# Patient Record
Sex: MALE | Race: WHITE | URBAN - METROPOLITAN AREA
[De-identification: names, ages, dates, MRNs, and addresses within clinical notes are randomized per-mention and may not be internally consistent; named-entity substitution may affect disease eponyms.]

---

## 2017-04-13 ENCOUNTER — HISTORICAL (OUTPATIENT)
Dept: RADIOLOGY | Facility: HOSPITAL | Age: 57
End: 2017-04-13

## 2020-12-11 ENCOUNTER — HISTORICAL (OUTPATIENT)
Dept: RADIOLOGY | Facility: HOSPITAL | Age: 60
End: 2020-12-11

## 2022-04-11 ENCOUNTER — HISTORICAL (OUTPATIENT)
Dept: ADMINISTRATIVE | Facility: HOSPITAL | Age: 62
End: 2022-04-11

## 2022-04-27 VITALS
BODY MASS INDEX: 24.66 KG/M2 | SYSTOLIC BLOOD PRESSURE: 118 MMHG | DIASTOLIC BLOOD PRESSURE: 84 MMHG | WEIGHT: 148 LBS | OXYGEN SATURATION: 98 % | HEIGHT: 65 IN

## 2025-02-14 ENCOUNTER — LAB VISIT (OUTPATIENT)
Dept: LAB | Facility: HOSPITAL | Age: 65
End: 2025-02-14
Attending: NURSE PRACTITIONER
Payer: MEDICARE

## 2025-02-14 DIAGNOSIS — Z82.49 FAMILY HISTORY OF ISCHEMIC HEART DISEASE: Primary | ICD-10-CM

## 2025-02-14 DIAGNOSIS — E78.5 HYPERLIPIDEMIA, UNSPECIFIED HYPERLIPIDEMIA TYPE: ICD-10-CM

## 2025-02-14 DIAGNOSIS — Z12.5 SPECIAL SCREENING FOR MALIGNANT NEOPLASM OF PROSTATE: ICD-10-CM

## 2025-02-14 DIAGNOSIS — I10 ESSENTIAL HYPERTENSION, MALIGNANT: ICD-10-CM

## 2025-02-14 LAB
ALBUMIN SERPL-MCNC: 3.4 G/DL (ref 3.4–4.8)
ALBUMIN/GLOB SERPL: 0.7 RATIO (ref 1.1–2)
ALP SERPL-CCNC: 85 UNIT/L (ref 40–150)
ALT SERPL-CCNC: 8 UNIT/L (ref 0–55)
ANION GAP SERPL CALC-SCNC: 5 MEQ/L
AST SERPL-CCNC: 21 UNIT/L (ref 5–34)
BACTERIA #/AREA URNS AUTO: ABNORMAL /HPF
BILIRUB SERPL-MCNC: 0.4 MG/DL
BILIRUB UR QL STRIP.AUTO: NEGATIVE
BUN SERPL-MCNC: 14.6 MG/DL (ref 8.4–25.7)
CALCIUM SERPL-MCNC: 9.8 MG/DL (ref 8.8–10)
CHLORIDE SERPL-SCNC: 106 MMOL/L (ref 98–107)
CHOLEST SERPL-MCNC: 233 MG/DL
CHOLEST/HDLC SERPL: 6 {RATIO} (ref 0–5)
CLARITY UR: CLEAR
CO2 SERPL-SCNC: 31 MMOL/L (ref 23–31)
COLOR UR AUTO: ABNORMAL
CREAT SERPL-MCNC: 0.97 MG/DL (ref 0.72–1.25)
CREAT/UREA NIT SERPL: 15
ERYTHROCYTE [DISTWIDTH] IN BLOOD BY AUTOMATED COUNT: 14.8 % (ref 11.5–17)
GFR SERPLBLD CREATININE-BSD FMLA CKD-EPI: >60 ML/MIN/1.73/M2
GLOBULIN SER-MCNC: 4.7 GM/DL (ref 2.4–3.5)
GLUCOSE SERPL-MCNC: 97 MG/DL (ref 82–115)
GLUCOSE UR QL STRIP: NEGATIVE
HCT VFR BLD AUTO: 45.8 % (ref 42–52)
HDLC SERPL-MCNC: 42 MG/DL (ref 35–60)
HGB BLD-MCNC: 15 G/DL (ref 14–18)
HGB UR QL STRIP: NEGATIVE
KETONES UR QL STRIP: NEGATIVE
LDLC SERPL CALC-MCNC: 151 MG/DL (ref 50–140)
LEUKOCYTE ESTERASE UR QL STRIP: NEGATIVE
MCH RBC QN AUTO: 29.5 PG (ref 27–31)
MCHC RBC AUTO-ENTMCNC: 32.8 G/DL (ref 33–36)
MCV RBC AUTO: 90.2 FL (ref 80–94)
MUCOUS THREADS URNS QL MICRO: ABNORMAL /LPF
NITRITE UR QL STRIP: POSITIVE
PH UR STRIP: 6 [PH]
PLATELET # BLD AUTO: 389 X10(3)/MCL (ref 130–400)
PMV BLD AUTO: 9.4 FL (ref 7.4–10.4)
POTASSIUM SERPL-SCNC: 4.9 MMOL/L (ref 3.5–5.1)
PROT SERPL-MCNC: 8.1 GM/DL (ref 5.8–7.6)
PROT UR QL STRIP: NEGATIVE
PSA SERPL-MCNC: 5.39 NG/ML
RBC # BLD AUTO: 5.08 X10(6)/MCL (ref 4.7–6.1)
RBC #/AREA URNS AUTO: ABNORMAL /HPF
SODIUM SERPL-SCNC: 142 MMOL/L (ref 136–145)
SP GR UR STRIP.AUTO: 1.02 (ref 1–1.03)
SQUAMOUS #/AREA URNS AUTO: ABNORMAL /HPF
TRIGL SERPL-MCNC: 202 MG/DL (ref 34–140)
TSH SERPL-ACNC: 3.23 UIU/ML (ref 0.35–4.94)
UROBILINOGEN UR STRIP-ACNC: 0.2
VLDLC SERPL CALC-MCNC: 40 MG/DL
WBC # BLD AUTO: 9.43 X10(3)/MCL (ref 4.5–11.5)
WBC #/AREA URNS AUTO: ABNORMAL /HPF

## 2025-02-14 PROCEDURE — 36415 COLL VENOUS BLD VENIPUNCTURE: CPT

## 2025-02-14 PROCEDURE — 80061 LIPID PANEL: CPT

## 2025-02-14 PROCEDURE — 80053 COMPREHEN METABOLIC PANEL: CPT

## 2025-02-14 PROCEDURE — 85027 COMPLETE CBC AUTOMATED: CPT

## 2025-02-14 PROCEDURE — 84153 ASSAY OF PSA TOTAL: CPT

## 2025-02-14 PROCEDURE — 81003 URINALYSIS AUTO W/O SCOPE: CPT

## 2025-02-14 PROCEDURE — 84443 ASSAY THYROID STIM HORMONE: CPT

## 2025-02-16 ENCOUNTER — HOSPITAL ENCOUNTER (EMERGENCY)
Facility: HOSPITAL | Age: 65
Discharge: HOME | End: 2025-02-17
Attending: SPECIALIST
Payer: MEDICARE

## 2025-02-16 VITALS
BODY MASS INDEX: 23.56 KG/M2 | OXYGEN SATURATION: 94 % | HEIGHT: 60 IN | HEART RATE: 94 BPM | WEIGHT: 120 LBS | SYSTOLIC BLOOD PRESSURE: 151 MMHG | DIASTOLIC BLOOD PRESSURE: 85 MMHG | TEMPERATURE: 98 F | RESPIRATION RATE: 18 BRPM

## 2025-02-16 DIAGNOSIS — T88.7XXA MEDICATION SIDE EFFECT: Primary | ICD-10-CM

## 2025-02-16 PROCEDURE — 99283 EMERGENCY DEPT VISIT LOW MDM: CPT

## 2025-02-17 NOTE — ED PROVIDER NOTES
"Encounter Date: 2/16/2025       History     Chief Complaint   Patient presents with    Medication Reaction     CO of new medication that he started yesterday "knocking" him out and causing the shakes. Does not know what mediciation but per JUNIOR just started Seroquel.      65-year-old male believes he had a reaction to Seroquel which made him feel "knocked out"; currently has no other complaints    The history is provided by the patient.     Review of patient's allergies indicates:  No Known Allergies  No past medical history on file.  No past surgical history on file.  No family history on file.  Social History[1]  Review of Systems   Constitutional: Negative.    HENT: Negative.     Respiratory: Negative.     Cardiovascular: Negative.    Gastrointestinal: Negative.    Genitourinary: Negative.    Musculoskeletal: Negative.    Neurological: Negative.    Psychiatric/Behavioral:  The patient is nervous/anxious.        Physical Exam     Initial Vitals [02/16/25 1956]   BP Pulse Resp Temp SpO2   (!) 151/85 94 18 97.9 °F (36.6 °C) (!) 94 %      MAP       --         Physical Exam    Nursing note and vitals reviewed.  Constitutional: He appears well-developed and well-nourished.   HENT:   Head: Normocephalic and atraumatic.   Eyes: EOM are normal. Pupils are equal, round, and reactive to light.   Neck: Neck supple.   Normal range of motion.  Cardiovascular:  Normal rate, regular rhythm and normal heart sounds.           Pulmonary/Chest: Breath sounds normal.   Abdominal: Abdomen is soft. There is no abdominal tenderness.   Musculoskeletal:         General: Normal range of motion.      Cervical back: Normal range of motion and neck supple.     Neurological: He is alert and oriented to person, place, and time.   Skin: Skin is warm and dry.   Psychiatric: He has a normal mood and affect. His behavior is normal. Thought content normal.         ED Course   Procedures  Labs Reviewed - No data to display       Imaging Results  "   None          Medications - No data to display  Medical Decision Making  Medication side-effect    Risk  Risk Details: Instructed to take half dose of Seroquel and follow up with primary care physician; exam unremarkable                                      Clinical Impression:  Final diagnoses:  [T88.7XXA] Medication side effect (Primary)          ED Disposition Condition    Discharge Stable          ED Prescriptions    None       Follow-up Information       Follow up With Specialties Details Why Contact Info    Primary care MD  Schedule an appointment as soon as possible for a visit                  [1]         Carroll Weir MD  02/16/25 [id number]

## 2025-02-17 NOTE — ED NOTES
"Attempted to call "Josef" again without any contact. Charge RN called pt's listed emergency contact- informed of d/c. Emergency contact unable to give pt a ride. Informed Emergency contact to contact Josef.   "

## 2025-02-17 NOTE — ED NOTES
"Attempted to call pt's transportation contact: "Josef" at [phone], number in pt's belongings- no answer. Unable to make contact.   "

## 2025-07-02 ENCOUNTER — HOSPITAL ENCOUNTER (OUTPATIENT)
Facility: HOSPITAL | Age: 65
Discharge: HOME | End: 2025-07-03
Admitting: INTERNAL MEDICINE
Payer: MEDICARE

## 2025-07-02 DIAGNOSIS — I63.9 CVA (CEREBRAL VASCULAR ACCIDENT): Primary | ICD-10-CM

## 2025-07-02 DIAGNOSIS — E78.5 HYPERLIPIDEMIA, UNSPECIFIED HYPERLIPIDEMIA TYPE: ICD-10-CM

## 2025-07-02 DIAGNOSIS — R29.818 ACUTE FOCAL NEUROLOGICAL DEFICIT: ICD-10-CM

## 2025-07-02 LAB
ACCEPTIBLE SP GR UR QL: >=1.03 (ref 1–1.03)
ALBUMIN SERPL-MCNC: 3.3 G/DL (ref 3.4–4.8)
ALBUMIN/GLOB SERPL: 0.9 RATIO (ref 1.1–2)
ALP SERPL-CCNC: 77 UNIT/L (ref 40–150)
ALT SERPL-CCNC: 10 UNIT/L (ref 0–55)
AMPHET UR QL SCN: NEGATIVE
ANION GAP SERPL CALC-SCNC: 2 MEQ/L
AST SERPL-CCNC: 22 UNIT/L (ref 11–45)
BARBITURATE SCN PRESENT UR: NEGATIVE
BASOPHILS # BLD AUTO: 0.01 X10(3)/MCL
BASOPHILS NFR BLD AUTO: 0.2 %
BENZODIAZ UR QL SCN: NEGATIVE
BILIRUB SERPL-MCNC: 0.4 MG/DL
BUN SERPL-MCNC: 15.5 MG/DL (ref 8.4–25.7)
CALCIUM SERPL-MCNC: 9.3 MG/DL (ref 8.8–10)
CANNABINOIDS UR QL SCN: NEGATIVE
CHLORIDE SERPL-SCNC: 111 MMOL/L (ref 98–107)
CHOLEST SERPL-MCNC: 164 MG/DL
CHOLEST/HDLC SERPL: 5 {RATIO} (ref 0–5)
CO2 SERPL-SCNC: 33 MMOL/L (ref 23–31)
COCAINE UR QL SCN: NEGATIVE
CREAT SERPL-MCNC: 0.97 MG/DL (ref 0.72–1.25)
CREAT/UREA NIT SERPL: 16
EOSINOPHIL # BLD AUTO: 0.29 X10(3)/MCL (ref 0–0.9)
EOSINOPHIL NFR BLD AUTO: 5.9 %
ERYTHROCYTE [DISTWIDTH] IN BLOOD BY AUTOMATED COUNT: 16.5 % (ref 11.5–17)
ETHANOL SERPL-MCNC: <10 MG/DL
FENTANYL UR QL SCN: NEGATIVE
GFR SERPLBLD CREATININE-BSD FMLA CKD-EPI: >60 ML/MIN/1.73/M2
GLOBULIN SER-MCNC: 3.7 GM/DL (ref 2.4–3.5)
GLUCOSE SERPL-MCNC: 114 MG/DL (ref 82–115)
HCO3 UR-SCNC: 26.9 MMOL/L (ref 24–28)
HCT VFR BLD AUTO: 60.1 % (ref 42–52)
HDLC SERPL-MCNC: 35 MG/DL (ref 35–60)
HGB BLD-MCNC: 19.7 G/DL (ref 14–18)
IMM GRANULOCYTES # BLD AUTO: 0 X10(3)/MCL (ref 0–0.04)
IMM GRANULOCYTES NFR BLD AUTO: 0 %
INR PPP: 1.1
LDLC SERPL CALC-MCNC: 109 MG/DL (ref 50–140)
LYMPHOCYTES # BLD AUTO: 1.79 X10(3)/MCL (ref 0.6–4.6)
LYMPHOCYTES NFR BLD AUTO: 36.3 %
MCH RBC QN AUTO: 28.5 PG (ref 27–31)
MCHC RBC AUTO-ENTMCNC: 32.8 G/DL (ref 33–36)
MCV RBC AUTO: 86.8 FL (ref 80–94)
MONOCYTES # BLD AUTO: 0.25 X10(3)/MCL (ref 0.1–1.3)
MONOCYTES NFR BLD AUTO: 5.1 %
NEUTROPHILS # BLD AUTO: 2.59 X10(3)/MCL (ref 2.1–9.2)
NEUTROPHILS NFR BLD AUTO: 52.5 %
OPIATES UR QL SCN: NEGATIVE
PCO2 BLDA: 46.4 MMHG (ref 35–45)
PCP UR QL: NEGATIVE
PH SMN: 7.37 [PH] (ref 7.35–7.45)
PH UR: 5.5 [PH] (ref 3–11)
PLATELET # BLD AUTO: 104 X10(3)/MCL (ref 130–400)
PMV BLD AUTO: 9.6 FL (ref 7.4–10.4)
PO2 BLDA: 57 MMHG (ref 40–60)
POC BE: 2 MMOL/L (ref -2–2)
POC SATURATED O2: 88 % (ref 95–100)
POC TCO2: 28 MMOL/L (ref 24–29)
POTASSIUM SERPL-SCNC: 3.3 MMOL/L (ref 3.5–5.1)
PROT SERPL-MCNC: 7 GM/DL (ref 5.8–7.6)
PROTHROMBIN TIME: 11 SECONDS (ref 12.5–14.5)
RBC # BLD AUTO: 6.92 X10(6)/MCL (ref 4.7–6.1)
SAMPLE: ABNORMAL
SODIUM SERPL-SCNC: 146 MMOL/L (ref 136–145)
TRIGL SERPL-MCNC: 102 MG/DL (ref 34–140)
TSH SERPL-ACNC: 2.66 UIU/ML (ref 0.35–4.94)
VLDLC SERPL CALC-MCNC: 20 MG/DL
WBC # BLD AUTO: 4.93 X10(3)/MCL (ref 4.5–11.5)

## 2025-07-02 PROCEDURE — 99900035 HC TECH TIME PER 15 MIN (STAT)

## 2025-07-02 PROCEDURE — 85025 COMPLETE CBC W/AUTO DIFF WBC: CPT

## 2025-07-02 PROCEDURE — 82803 BLOOD GASES ANY COMBINATION: CPT

## 2025-07-02 PROCEDURE — 82077 ASSAY SPEC XCP UR&BREATH IA: CPT

## 2025-07-02 PROCEDURE — 25000003 PHARM REV CODE 250

## 2025-07-02 PROCEDURE — 85610 PROTHROMBIN TIME: CPT

## 2025-07-02 PROCEDURE — 93005 ELECTROCARDIOGRAM TRACING: CPT

## 2025-07-02 PROCEDURE — G0378 HOSPITAL OBSERVATION PER HR: HCPCS

## 2025-07-02 PROCEDURE — 96374 THER/PROPH/DIAG INJ IV PUSH: CPT

## 2025-07-02 PROCEDURE — 80307 DRUG TEST PRSMV CHEM ANLYZR: CPT

## 2025-07-02 PROCEDURE — 84443 ASSAY THYROID STIM HORMONE: CPT

## 2025-07-02 PROCEDURE — 99285 EMERGENCY DEPT VISIT HI MDM: CPT | Mod: 25

## 2025-07-02 PROCEDURE — 80061 LIPID PANEL: CPT

## 2025-07-02 PROCEDURE — 93010 ELECTROCARDIOGRAM REPORT: CPT | Mod: ,,, | Performed by: INTERNAL MEDICINE

## 2025-07-02 PROCEDURE — 80053 COMPREHEN METABOLIC PANEL: CPT

## 2025-07-02 PROCEDURE — 63600175 PHARM REV CODE 636 W HCPCS

## 2025-07-02 RX ORDER — ENOXAPARIN SODIUM 100 MG/ML
40 INJECTION SUBCUTANEOUS EVERY 24 HOURS
Status: DISCONTINUED | OUTPATIENT
Start: 2025-07-03 | End: 2025-07-03 | Stop reason: HOSPADM

## 2025-07-02 RX ORDER — SERTRALINE HYDROCHLORIDE 100 MG/1
200 TABLET, FILM COATED ORAL DAILY
Status: ON HOLD | COMMUNITY
Start: 2025-05-14 | End: 2025-07-03 | Stop reason: HOSPADM

## 2025-07-02 RX ORDER — CITALOPRAM 20 MG/1
20 TABLET ORAL DAILY
COMMUNITY

## 2025-07-02 RX ORDER — DIVALPROEX SODIUM 500 MG/1
1000 TABLET, DELAYED RELEASE ORAL NIGHTLY
COMMUNITY

## 2025-07-02 RX ORDER — THIAMINE HYDROCHLORIDE 100 MG/ML
400 INJECTION, SOLUTION INTRAMUSCULAR; INTRAVENOUS ONCE
Status: COMPLETED | OUTPATIENT
Start: 2025-07-02 | End: 2025-07-02

## 2025-07-02 RX ORDER — BUPROPION HYDROCHLORIDE 150 MG/1
150 TABLET ORAL DAILY
COMMUNITY
Start: 2025-05-14

## 2025-07-02 RX ORDER — ATORVASTATIN CALCIUM 80 MG/1
80 TABLET, FILM COATED ORAL DAILY
Status: ON HOLD | COMMUNITY
Start: 2025-06-10 | End: 2025-07-03

## 2025-07-02 RX ORDER — QUETIAPINE FUMARATE 100 MG/1
100 TABLET, FILM COATED ORAL NIGHTLY
COMMUNITY
Start: 2025-05-14

## 2025-07-02 RX ORDER — POTASSIUM CHLORIDE 20 MEQ/1
40 TABLET, EXTENDED RELEASE ORAL
Status: COMPLETED | OUTPATIENT
Start: 2025-07-02 | End: 2025-07-02

## 2025-07-02 RX ORDER — PRAVASTATIN SODIUM 80 MG/1
80 TABLET ORAL DAILY
Status: ON HOLD | COMMUNITY
End: 2025-07-03 | Stop reason: HOSPADM

## 2025-07-02 RX ORDER — ALBUTEROL SULFATE 90 UG/1
2 INHALANT RESPIRATORY (INHALATION) EVERY 4 HOURS PRN
COMMUNITY
Start: 2025-02-14

## 2025-07-02 RX ADMIN — THIAMINE HYDROCHLORIDE 400 MG: 100 INJECTION, SOLUTION INTRAMUSCULAR; INTRAVENOUS at 10:07

## 2025-07-02 RX ADMIN — POTASSIUM CHLORIDE 40 MEQ: 1500 TABLET, EXTENDED RELEASE ORAL at 10:07

## 2025-07-02 NOTE — Clinical Note
"Nabeel Mooney" Rosio was seen and treated in our emergency department on 7/2/2025.  He may return to work on 07/04/2025.       If you have any questions or concerns, please don't hesitate to call.       RN    "

## 2025-07-03 VITALS
DIASTOLIC BLOOD PRESSURE: 79 MMHG | HEART RATE: 71 BPM | HEIGHT: 60 IN | WEIGHT: 128.31 LBS | TEMPERATURE: 97 F | SYSTOLIC BLOOD PRESSURE: 142 MMHG | BODY MASS INDEX: 25.19 KG/M2 | OXYGEN SATURATION: 96 % | RESPIRATION RATE: 18 BRPM

## 2025-07-03 PROBLEM — R47.81 SLURRED SPEECH: Status: ACTIVE | Noted: 2025-07-03

## 2025-07-03 LAB
ALBUMIN SERPL-MCNC: 3.1 G/DL (ref 3.4–4.8)
ALBUMIN/GLOB SERPL: 0.9 RATIO (ref 1.1–2)
ALP SERPL-CCNC: 82 UNIT/L (ref 40–150)
ALT SERPL-CCNC: 9 UNIT/L (ref 0–55)
ANION GAP SERPL CALC-SCNC: 5 MEQ/L
AST SERPL-CCNC: 22 UNIT/L (ref 11–45)
BASOPHILS # BLD AUTO: 0.03 X10(3)/MCL
BASOPHILS NFR BLD AUTO: 0.3 %
BILIRUB SERPL-MCNC: 0.6 MG/DL
BUN SERPL-MCNC: 11.4 MG/DL (ref 8.4–25.7)
CALCIUM SERPL-MCNC: 8.6 MG/DL (ref 8.8–10)
CHLORIDE SERPL-SCNC: 110 MMOL/L (ref 98–107)
CO2 SERPL-SCNC: 29 MMOL/L (ref 23–31)
CREAT SERPL-MCNC: 0.87 MG/DL (ref 0.72–1.25)
CREAT/UREA NIT SERPL: 13
EOSINOPHIL # BLD AUTO: 0.31 X10(3)/MCL (ref 0–0.9)
EOSINOPHIL NFR BLD AUTO: 2.8 %
ERYTHROCYTE [DISTWIDTH] IN BLOOD BY AUTOMATED COUNT: 14.8 % (ref 11.5–17)
GFR SERPLBLD CREATININE-BSD FMLA CKD-EPI: >60 ML/MIN/1.73/M2
GLOBULIN SER-MCNC: 3.5 GM/DL (ref 2.4–3.5)
GLUCOSE SERPL-MCNC: 95 MG/DL (ref 82–115)
HCT VFR BLD AUTO: 42.1 % (ref 42–52)
HGB BLD-MCNC: 13.7 G/DL (ref 14–18)
IMM GRANULOCYTES # BLD AUTO: 0.01 X10(3)/MCL (ref 0–0.04)
IMM GRANULOCYTES NFR BLD AUTO: 0.1 %
LYMPHOCYTES # BLD AUTO: 2.86 X10(3)/MCL (ref 0.6–4.6)
LYMPHOCYTES NFR BLD AUTO: 25.8 %
MAGNESIUM SERPL-MCNC: 1.8 MG/DL (ref 1.6–2.6)
MCH RBC QN AUTO: 28.9 PG (ref 27–31)
MCHC RBC AUTO-ENTMCNC: 32.5 G/DL (ref 33–36)
MCV RBC AUTO: 88.8 FL (ref 80–94)
MONOCYTES # BLD AUTO: 0.71 X10(3)/MCL (ref 0.1–1.3)
MONOCYTES NFR BLD AUTO: 6.4 %
NEUTROPHILS # BLD AUTO: 7.16 X10(3)/MCL (ref 2.1–9.2)
NEUTROPHILS NFR BLD AUTO: 64.6 %
OHS QRS DURATION: 86 MS
OHS QTC CALCULATION: 425 MS
PHOSPHATE SERPL-MCNC: 2.6 MG/DL (ref 2.3–4.7)
PLATELET # BLD AUTO: 273 X10(3)/MCL (ref 130–400)
PMV BLD AUTO: 9.4 FL (ref 7.4–10.4)
POTASSIUM SERPL-SCNC: 3.8 MMOL/L (ref 3.5–5.1)
PROT SERPL-MCNC: 6.6 GM/DL (ref 5.8–7.6)
RBC # BLD AUTO: 4.74 X10(6)/MCL (ref 4.7–6.1)
SODIUM SERPL-SCNC: 144 MMOL/L (ref 136–145)
WBC # BLD AUTO: 11.08 X10(3)/MCL (ref 4.5–11.5)

## 2025-07-03 PROCEDURE — 80053 COMPREHEN METABOLIC PANEL: CPT | Performed by: INTERNAL MEDICINE

## 2025-07-03 PROCEDURE — G0378 HOSPITAL OBSERVATION PER HR: HCPCS

## 2025-07-03 PROCEDURE — 94760 N-INVAS EAR/PLS OXIMETRY 1: CPT

## 2025-07-03 PROCEDURE — 85025 COMPLETE CBC W/AUTO DIFF WBC: CPT | Performed by: INTERNAL MEDICINE

## 2025-07-03 PROCEDURE — 97161 PT EVAL LOW COMPLEX 20 MIN: CPT

## 2025-07-03 PROCEDURE — 99900035 HC TECH TIME PER 15 MIN (STAT)

## 2025-07-03 PROCEDURE — 25000003 PHARM REV CODE 250: Performed by: INTERNAL MEDICINE

## 2025-07-03 PROCEDURE — 83735 ASSAY OF MAGNESIUM: CPT | Performed by: INTERNAL MEDICINE

## 2025-07-03 PROCEDURE — 92523 SPEECH SOUND LANG COMPREHEN: CPT

## 2025-07-03 PROCEDURE — 97165 OT EVAL LOW COMPLEX 30 MIN: CPT

## 2025-07-03 PROCEDURE — 92610 EVALUATE SWALLOWING FUNCTION: CPT

## 2025-07-03 PROCEDURE — 94799 UNLISTED PULMONARY SVC/PX: CPT

## 2025-07-03 PROCEDURE — 36415 COLL VENOUS BLD VENIPUNCTURE: CPT | Performed by: INTERNAL MEDICINE

## 2025-07-03 PROCEDURE — 25500020 PHARM REV CODE 255: Performed by: INTERNAL MEDICINE

## 2025-07-03 PROCEDURE — 84100 ASSAY OF PHOSPHORUS: CPT | Performed by: INTERNAL MEDICINE

## 2025-07-03 RX ORDER — ASPIRIN 81 MG/1
81 TABLET ORAL DAILY
Qty: 30 TABLET | Refills: 0 | Status: SHIPPED | OUTPATIENT
Start: 2025-07-04 | End: 2025-08-03

## 2025-07-03 RX ORDER — HYDRALAZINE HYDROCHLORIDE 20 MG/ML
10 INJECTION INTRAMUSCULAR; INTRAVENOUS EVERY 4 HOURS PRN
Status: DISCONTINUED | OUTPATIENT
Start: 2025-07-03 | End: 2025-07-03 | Stop reason: HOSPADM

## 2025-07-03 RX ORDER — DOCUSATE SODIUM 100 MG/1
100 CAPSULE, LIQUID FILLED ORAL 2 TIMES DAILY PRN
Status: DISCONTINUED | OUTPATIENT
Start: 2025-07-03 | End: 2025-07-03 | Stop reason: HOSPADM

## 2025-07-03 RX ORDER — ASPIRIN 81 MG/1
81 TABLET ORAL DAILY
Status: DISCONTINUED | OUTPATIENT
Start: 2025-07-04 | End: 2025-07-03 | Stop reason: HOSPADM

## 2025-07-03 RX ORDER — CLOPIDOGREL BISULFATE 75 MG/1
75 TABLET ORAL DAILY
Qty: 30 TABLET | Refills: 0 | Status: SHIPPED | OUTPATIENT
Start: 2025-07-04 | End: 2025-08-03

## 2025-07-03 RX ORDER — PANTOPRAZOLE SODIUM 40 MG/1
40 TABLET, DELAYED RELEASE ORAL DAILY
Qty: 30 TABLET | Refills: 0 | Status: SHIPPED | OUTPATIENT
Start: 2025-07-04 | End: 2025-08-03

## 2025-07-03 RX ORDER — PANTOPRAZOLE SODIUM 40 MG/1
40 TABLET, DELAYED RELEASE ORAL DAILY
Status: DISCONTINUED | OUTPATIENT
Start: 2025-07-03 | End: 2025-07-03 | Stop reason: HOSPADM

## 2025-07-03 RX ORDER — CALCIUM CARBONATE 200(500)MG
1000 TABLET,CHEWABLE ORAL 3 TIMES DAILY PRN
Status: DISCONTINUED | OUTPATIENT
Start: 2025-07-03 | End: 2025-07-03 | Stop reason: HOSPADM

## 2025-07-03 RX ORDER — BISACODYL 5 MG
5 TABLET, DELAYED RELEASE (ENTERIC COATED) ORAL DAILY PRN
Status: DISCONTINUED | OUTPATIENT
Start: 2025-07-03 | End: 2025-07-03 | Stop reason: HOSPADM

## 2025-07-03 RX ORDER — CLONIDINE HYDROCHLORIDE 0.1 MG/1
0.2 TABLET ORAL EVERY 4 HOURS PRN
Status: DISCONTINUED | OUTPATIENT
Start: 2025-07-03 | End: 2025-07-03 | Stop reason: HOSPADM

## 2025-07-03 RX ORDER — ATORVASTATIN CALCIUM 40 MG/1
80 TABLET, FILM COATED ORAL DAILY
Status: DISCONTINUED | OUTPATIENT
Start: 2025-07-03 | End: 2025-07-03 | Stop reason: HOSPADM

## 2025-07-03 RX ORDER — LABETALOL HCL 20 MG/4 ML
10 SYRINGE (ML) INTRAVENOUS 4 TIMES DAILY PRN
Status: DISCONTINUED | OUTPATIENT
Start: 2025-07-03 | End: 2025-07-03 | Stop reason: HOSPADM

## 2025-07-03 RX ORDER — CITALOPRAM 20 MG/1
20 TABLET ORAL DAILY
Status: DISCONTINUED | OUTPATIENT
Start: 2025-07-03 | End: 2025-07-03 | Stop reason: HOSPADM

## 2025-07-03 RX ORDER — CLOPIDOGREL BISULFATE 75 MG/1
75 TABLET ORAL DAILY
Status: DISCONTINUED | OUTPATIENT
Start: 2025-07-03 | End: 2025-07-03 | Stop reason: HOSPADM

## 2025-07-03 RX ORDER — ATORVASTATIN CALCIUM 80 MG/1
80 TABLET, FILM COATED ORAL DAILY
Qty: 30 TABLET | Refills: 0 | Status: SHIPPED | OUTPATIENT
Start: 2025-07-03 | End: 2025-08-02

## 2025-07-03 RX ORDER — SIMETHICONE 80 MG
1 TABLET,CHEWABLE ORAL 4 TIMES DAILY PRN
Status: DISCONTINUED | OUTPATIENT
Start: 2025-07-03 | End: 2025-07-03 | Stop reason: HOSPADM

## 2025-07-03 RX ORDER — DIVALPROEX SODIUM 250 MG/1
1000 TABLET, DELAYED RELEASE ORAL NIGHTLY
Status: DISCONTINUED | OUTPATIENT
Start: 2025-07-03 | End: 2025-07-03 | Stop reason: HOSPADM

## 2025-07-03 RX ORDER — BENZONATATE 100 MG/1
100 CAPSULE ORAL 3 TIMES DAILY PRN
Status: DISCONTINUED | OUTPATIENT
Start: 2025-07-03 | End: 2025-07-03 | Stop reason: HOSPADM

## 2025-07-03 RX ORDER — IPRATROPIUM BROMIDE AND ALBUTEROL SULFATE 2.5; .5 MG/3ML; MG/3ML
3 SOLUTION RESPIRATORY (INHALATION) EVERY 4 HOURS PRN
Status: DISCONTINUED | OUTPATIENT
Start: 2025-07-03 | End: 2025-07-03 | Stop reason: HOSPADM

## 2025-07-03 RX ORDER — HYDROXYZINE PAMOATE 25 MG/1
25 CAPSULE ORAL EVERY 8 HOURS PRN
Status: DISCONTINUED | OUTPATIENT
Start: 2025-07-03 | End: 2025-07-03 | Stop reason: HOSPADM

## 2025-07-03 RX ORDER — SODIUM CHLORIDE 9 MG/ML
INJECTION, SOLUTION INTRAVENOUS CONTINUOUS
Status: DISCONTINUED | OUTPATIENT
Start: 2025-07-03 | End: 2025-07-03 | Stop reason: HOSPADM

## 2025-07-03 RX ORDER — BUPROPION HYDROCHLORIDE 150 MG/1
150 TABLET ORAL DAILY
Status: DISCONTINUED | OUTPATIENT
Start: 2025-07-03 | End: 2025-07-03 | Stop reason: HOSPADM

## 2025-07-03 RX ORDER — ASPIRIN 325 MG
325 TABLET, DELAYED RELEASE (ENTERIC COATED) ORAL DAILY
Status: DISCONTINUED | OUTPATIENT
Start: 2025-07-03 | End: 2025-07-03

## 2025-07-03 RX ADMIN — SODIUM CHLORIDE: 9 INJECTION, SOLUTION INTRAVENOUS at 10:07

## 2025-07-03 RX ADMIN — BUPROPION HYDROCHLORIDE 150 MG: 150 TABLET, EXTENDED RELEASE ORAL at 08:07

## 2025-07-03 RX ADMIN — CLOPIDOGREL 75 MG: 75 TABLET ORAL at 10:07

## 2025-07-03 RX ADMIN — ATORVASTATIN CALCIUM 80 MG: 40 TABLET, FILM COATED ORAL at 08:07

## 2025-07-03 RX ADMIN — IOHEXOL 75 ML: 350 INJECTION, SOLUTION INTRAVENOUS at 07:07

## 2025-07-03 RX ADMIN — PANTOPRAZOLE SODIUM 40 MG: 40 TABLET, DELAYED RELEASE ORAL at 08:07

## 2025-07-03 RX ADMIN — CITALOPRAM HYDROBROMIDE 20 MG: 20 TABLET ORAL at 08:07

## 2025-07-03 NOTE — CARE UPDATE
Care Update Note - 07/03/2025  MRI brain without contrast revealed subtle diffusion restriction within the left basal ganglia, suspicious for acute-early subacute ischemic infarct. No evidence of hemorrhage or mass effect. Chronic small vessel ischemic changes and mild generalized brain atrophy noted.  Given these findings, the patient will be started on dual antiplatelet therapy with aspirin and clopidogrel. Speech therapy evaluation remains pending, but patient is currently at neurological baseline. Discharge remains likely for today, with planned outpatient follow-up including ST and potentially PT/OT, as needed based on final recommendations.  Neurology consult not immediately required; stroke education and outpatient coordination will be initiated.

## 2025-07-03 NOTE — PT/OT/SLP EVAL
Speech Language Pathology Evaluation  Cognitive/Bedside Swallow    Patient Name:  Nabeel Avelar   MRN:  [id number]  Admitting Diagnosis: Slurred speech    Recommendations:                  General Recommendations:  Follow-up not indicated  Diet recommendations:  Regular Diet - IDDSI Level 7, Thin liquids - IDDSI Level 0   Aspiration Precautions: HOB to 90 degrees   General Precautions: Standard,    Communication strategies:  go to room if call light pushed  Discharge recommendations:  No Therapy Indicated   Barriers to Discharge:  None    Assessment:     Nabeel Avelar is a 65 y.o. male with an SLP diagnosis of n/a.  He presents at baseline w/ speech, cognition and swallowing. SLP intervention is not warranted at this time.  Patient reports he lives in a camper alone. He also reports to SLP that his fridge is broken and his camper leaks when it rains.    History:     No past medical history on file.    No past surgical history on file.    Social History: Patient lives alone.    Prior Intubation HX:  n/a    Modified Barium Swallow: n/a    Chest X-Rays: n/a    Prior diet: NPO    Occupation/hobbies/homemaking: Patient reports living Independently and walks places.    Subjective     Patient seen at bedside for eval. Patient pleasant and in good spirits.    Patient goals: none stated     Pain/Comfort:       Respiratory Status: Room air    Objective:     Cognitive Status:    Orientation Person, Place, and Situation  Memory Immediate Recall WFL, Delayedimpaired, and long term recall impaired  [employer] 66% accuracy       Receptive Language:   Comprehension:      Questions Complex yes/no 100% accuracy   Commands  One step 100% accuracy     Pragmatics:    WNL    Expressive Language:  Verbal:    Wh questions: 100% accuracy         Motor Speech:  Intelligibility 80-90% to unfamiliar listener. This is pt's baseline.    Voice:   WFL    Visual-Spatial:  Not formally assessed. No deficits noted per OT/PT eval while  ambulating.    Reading:   Unable to assess at baseline Patient demonstrates difficulty reading due to education level.     Written Expression:   Unable to assess    Oral Musculature Evaluation  Dentition: edentulous  Voice Prior to PO Intake: WNL    Bedside Swallow Eval:   Consistencies Assessed:  Thin liquids via single and sequential sips  Puree pudding  Solids kina crackers     Oral Phase:   WFL    Pharyngeal Phase:   no overt clinical signs/symptoms of pharyngeal dysphagia    Compensatory Strategies  None    Functional Oral Intake Scale (FOIS) (Niurka et al., 2005)  The FOIS is a clinical assessment measure used the define the functional oral intake of patients.     FOIS level   Description   1  Nothing by mouth.   2  Tube dependent with minimal attempts of food or liquid.   3  Tube dependent with consistent oral intake of food or liquid.   4  Total oral diet of a single consistency.   5  Total oral diet with multiple consistencies, but required special    preparation or compensation.   6  Total oral diet with multiple consistencies without special preparation    but with specific food limitations.   7  Total oral diet with no restrictions.     Patient's current FOIS score =  7    Brief Interview for Mental Status (BIMS) administered to which Patient scored 8/15.     Goals:   Multidisciplinary Problems       SLP Goals          Problem: SLP    Goal Priority Disciplines Outcome   SLP Goal     SLP                        Plan:     Patient to be seen:      Plan of Care expires:     Plan of Care reviewed with:  patient   SLP Follow-Up:  No       Time Tracking:     SLP Treatment Date:      Speech Start Time:  1025  Speech Stop Time:  1045     Speech Total Time (min):  20 min    Billable Minutes: Eval 12 speech,language,cognition; 8 swallowing     Mary Kate Aldana M.S., CCC-SLP   07/03/2025

## 2025-07-03 NOTE — PROGRESS NOTES
Inpatient Nutrition Assessment    Admit Date: 7/2/2025   Total duration of encounter: 1 day   Patient Age: 65 y.o.    Nutrition Recommendation/Prescription     Continue regular diet  Monitor intake,wt, labs,medications    Communication of Recommendations: reviewed with patient    Nutrition Assessment     Malnutrition Assessment/Nutrition-Focused Physical Exam       Malnutrition Level: other (see comments) (Does not meet criteria) (07/03/25 1332)     Weight Loss (Malnutrition): other (see comments) (Does not meet criteria) (07/03/25 1332)     Orbital Region (Subcutaneous Fat Loss): well nourished                                   Fluid Accumulation (Malnutrition): other (see comments) (Unable to assess) (07/03/25 1332)     Hand  Strength, Right (Malnutrition): Unable to assess (07/03/25 1332)  A minimum of two characteristics is recommended for diagnosis of either severe or non-severe malnutrition.    Chart Review    Reason Seen: continuous nutrition monitoring, length of stay, and malnutrition screening tool (MST)    Malnutrition Screening Tool Results   Have you recently lost weight without trying?: Unsure  Have you been eating poorly because of a decreased appetite?: No   MST Score: 2   Diagnosis:  acute focal neurologic deficit (slurred speech):  Acute focal neurologic deficit (TIA vs CVA vs other):  Hypokalemia: replace K+ 3.3  Elevated Hgb/Hct   Borderline thrombocytopenia  Alcohol use disorder  HTN  Disposition      Relevant Medical History: HTN, HLD    Scheduled Medications:  [START ON 7/4/2025] aspirin, 81 mg, Daily  atorvastatin, 80 mg, Daily  buPROPion, 150 mg, Daily  citalopram, 20 mg, Daily  clopidogreL, 75 mg, Daily  divalproex, 1,000 mg, QHS  enoxparin, 40 mg, Q24H (prophylaxis, 1700)  pantoprazole, 40 mg, Daily    Continuous Infusions:  0.9% NaCl, Last Rate: 200 mL/hr at 07/03/25 1031    PRN Medications:  albuterol-ipratropium, 3 mL, Q4H PRN  benzonatate, 100 mg, TID PRN  bisacodyL, 5 mg, Daily  PRN  calcium carbonate, 1,000 mg, TID PRN  cloNIDine, 0.2 mg, Q4H PRN  docusate sodium, 100 mg, BID PRN  hydrALAZINE, 10 mg, Q4H PRN  hydrOXYzine pamoate, 25 mg, Q8H PRN  labetalol, 10 mg, QID PRN  simethicone, 1 tablet, QID PRN    Calorie Containing IV Medications: no significant kcals from medications at this time    Recent Labs   Lab 07/02/25  2107 07/03/25  0901 07/03/25  0935   * 144  --    K 3.3* 3.8  --    CALCIUM 9.3 8.6*  --    PHOS  --  2.6  --    MG  --  1.80  --    * 110*  --    CO2 33* 29  --    BUN 15.5 11.4  --    CREATININE 0.97 0.87  --    EGFRNORACEVR >60 >60  --     95  --    BILITOT 0.4 0.6  --    ALKPHOS 77 82  --    ALT 10 9  --    AST 22 22  --    ALBUMIN 3.3* 3.1*  --    TRIG 102  --   --    WBC 4.93  --  11.08   HGB 19.7*  --  13.7*   HCT 60.1*  --  42.1     Nutrition Orders:  Diet Adult Regular Standard Tray      Appetite/Oral Intake: good/% of meals  Factors Affecting Nutritional Intake: none identified  Social Needs Impacting Access to Food: none identified  Food/Protestant/Cultural Preferences: none reported  Food Allergies: none reported  Last Bowel Movement: 07/02/25  Wound(s):      Comments    07/03/25: MST screen 2. Pt reports intake is good at home and denies wt loss. SLP clear pt for regular consistency. Pt eating lunch during rounds. Labs/medications review. +BM on 07/02/25. Will monitor .    Anthropometrics    Height: 5' (152.4 cm), Height Method: Stated  Last Weight: 58.2 kg (128 lb 4.9 oz) (07/03/25 1330), Weight Method: Bed Scale  BMI (Calculated): 25.1  BMI Classification: overweight (BMI 25-29.9)        Ideal Body Weight (IBW), Male: 106 lb     % Ideal Body Weight, Male (lb): 121.05 %                          Usual Weight Provided By: patient denies unintentional weight loss    Wt Readings from Last 5 Encounters:   07/03/25 58.2 kg (128 lb 4.9 oz)   02/16/25 54.4 kg (120 lb)   03/18/21 67.1 kg (147 lb 15.9 oz)     Weight Change(s) Since Admission:    Wt Readings from Last 1 Encounters:   07/03/25 1330 58.2 kg (128 lb 4.9 oz)   07/02/25 2316 58.2 kg (128 lb 4.9 oz)   07/02/25 2031 54.4 kg (120 lb)   Admit Weight: 54.4 kg (120 lb) (07/02/25 2031), Weight Method: Stated    Estimated Needs    Weight Used For Calorie Calculations: 58.2 kg (128 lb 4.9 oz)  Energy Calorie Requirements (kcal): 2037 kcal (35 kcal/kg/CBW)  Energy Need Method: Kcal/kg  Weight Used For Protein Calculations: 58.2 kg (128 lb 4.9 oz)  Protein Requirements: 69.99 gm (1.2 gm/kg/CBW)  Fluid Requirements (mL): 2037 mL (1 mL/kcal)        Enteral Nutrition     Patient not receiving enteral nutrition at this time.    Parenteral Nutrition     Patient not receiving parenteral nutrition support at this time.    Evaluation of Received Nutrient Intake    Calories: meeting estimated needs  Protein: meeting estimated needs    Patient Education     Not applicable.    Nutrition Diagnosis     PES: No nutrition diagnosis at this time     PES:            Nutrition Interventions     Intervention(s): general/healthful diet  Intervention(s):      Goal: Consume % of meals/snacks by follow-up. (new)  Goal: Maintain weight throughout hospitalization. (new)    Nutrition Goals & Monitoring     Dietitian will monitor: food and beverage intake, weight, weight change, electrolyte/renal panel, glucose/endocrine profile, and gastrointestinal profile  Discharge planning: continue regular diet  Nutrition Risk/Follow-Up: dietitian will follow-up one time per week   Please consult if re-assessment needed sooner.

## 2025-07-03 NOTE — PLAN OF CARE
Problem: Adult Inpatient Plan of Care  Goal: Plan of Care Review  Outcome: Progressing  Flowsheets (Taken 7/2/2025 2320)  Plan of Care Reviewed With: patient  Goal: Patient-Specific Goal (Individualized)  Outcome: Progressing  Goal: Absence of Hospital-Acquired Illness or Injury  Outcome: Progressing  Intervention: Identify and Manage Fall Risk  Flowsheets (Taken 7/2/2025 2320)  Safety Promotion/Fall Prevention:   assistive device/personal item within reach   bed alarm set   side rails raised x 3   room near unit station  Intervention: Prevent Skin Injury  Flowsheets (Taken 7/2/2025 2320)  Body Position: position changed independently  Skin Protection: incontinence pads utilized  Intervention: Prevent and Manage VTE (Venous Thromboembolism) Risk  Flowsheets (Taken 7/2/2025 2320)  VTE Prevention/Management:   bleeding precautions maintained   bleeding risk assessed  Intervention: Prevent Infection  Flowsheets (Taken 7/2/2025 2320)  Infection Prevention:   cohorting utilized   environmental surveillance performed   equipment surfaces disinfected   hand hygiene promoted   personal protective equipment utilized   rest/sleep promoted   single patient room provided  Goal: Optimal Comfort and Wellbeing  Outcome: Progressing  Intervention: Monitor Pain and Promote Comfort  Flowsheets (Taken 7/3/2025 0532)  Pain Management Interventions:   care clustered   relaxation techniques promoted  Intervention: Provide Person-Centered Care  Flowsheets (Taken 7/2/2025 2320)  Trust Relationship/Rapport:   care explained   choices provided   emotional support provided   empathic listening provided   questions answered   questions encouraged   reassurance provided   thoughts/feelings acknowledged  Goal: Readiness for Transition of Care  Outcome: Progressing

## 2025-07-03 NOTE — DISCHARGE SUMMARY
[facility]  HOSPITAL MEDICINE - DISCHARGE SUMMARY    Patient Name: Nabeel Avelar  MRN: [id number]  Admission Date: 7/2/2025  Discharge Date: 07/03/2025  Hospital Length of Stay: 0 days  Discharge Provider: Meghann Stiles MD  Primary Care Provider: Gissel, Primary Doctor      HOSPITAL COURSE     The patient is a 65-year-old male with a history of hypertension and hyperlipidemia who presented via EMS after family noted the sudden onset of slurred speech around 1930. Initial EMS evaluation showed normal CBG and no focal deficits. In the ED, the patient was found to have garbled speech and mild strabismus, though motor strength and orientation were intact. NIHSS on arrival was 3. CT head and CTA head/neck were unremarkable. Labs were notable for hypokalemia, elevated hemoglobin/hematocrit (likely hemoconcentration), and borderline thrombocytopenia. These were monitored and managed appropriately during hospitalization.  MRI brain without contrast subsequently revealed subtle diffusion restriction within the left basal ganglia, consistent with an acute to early subacute ischemic infarct. No evidence of hemorrhage or mass effect was noted. Given this, the patient was started on dual antiplatelet therapy with aspirin and clopidogrel. He remained at neurological baseline without recurrence of symptoms. There was no clinical indication for inpatient neurology consultation.  He received thiamine for suspected chronic alcohol use and was monitored for potential withdrawal, though he remained clinically stable throughout. Blood pressure was controlled without the need for acute antihypertensive therapy, and potassium levels were repleted.  At the time of discharge, the patient was back at baseline with complete resolution of presenting symptoms. Speech therapy evaluation remains pending at time of discharge but will be completed outpatient. He is stable for discharge home with outpatient follow-up  arranged, including neurology, speech therapy, and possibly PT/OT depending on final recommendations.      PHYSICAL EXAM     Most Recent Vital Signs:  Temp: 97.9 °F (36.6 °C) (07/03/25 0302)  Pulse: 78 (07/03/25 0811)  Resp: 18 (07/03/25 0811)  BP: (!) 164/90 (07/03/25 0302)  SpO2: 98 % (07/03/25 0811)   GENERAL: In no acute distress, afebrile  HEENT: Atraumatic, normocephalic,  CHEST: Unlabored  HEART: S1, S2  ABDOMEN: Non distended            DISCHARGE DIAGNOSIS     Active Hospital Problems    Diagnosis  POA    *Slurred speech [R47.81]  Yes      Resolved Hospital Problems   No resolved problems to display.          See H and P from earlier  _____________________________________________________________________________      DISCHARGE MED REC     Current Discharge Medication List        START taking these medications    Details   aspirin (ECOTRIN) 81 MG EC tablet Take 1 tablet (81 mg total) by mouth once daily.  Qty: 30 tablet, Refills: 0      clopidogreL (PLAVIX) 75 mg tablet Take 1 tablet (75 mg total) by mouth once daily.  Qty: 30 tablet, Refills: 0      pantoprazole (PROTONIX) 40 MG tablet Take 1 tablet (40 mg total) by mouth once daily.  Qty: 30 tablet, Refills: 0           CONTINUE these medications which have CHANGED    Details   atorvastatin (LIPITOR) 80 MG tablet Take 1 tablet (80 mg total) by mouth once daily.  Qty: 30 tablet, Refills: 0           CONTINUE these medications which have NOT CHANGED    Details   albuterol (PROVENTIL/VENTOLIN HFA) 90 mcg/actuation inhaler 2 puffs every 4 (four) hours as needed.      buPROPion (WELLBUTRIN XL) 150 MG TB24 tablet Take 150 mg by mouth once daily.      citalopram (CELEXA) 20 MG tablet Take 20 mg by mouth once daily.      divalproex (DEPAKOTE) 500 MG TbEC Take 1,000 mg by mouth every evening.      QUEtiapine (SEROQUEL) 100 MG Tab Take 100 mg by mouth every evening.           STOP taking these medications       pravastatin (PRAVACHOL) 80 MG tablet Comments:   Reason  for Stopping:         sertraline (ZOLOFT) 100 MG tablet Comments:   Reason for Stopping:                  CONSULTS         FOLLOW UP      Follow-up Information       No, Primary Doctor Follow up.                                 DISCHARGE INSTRUCTIONS     Explained in detail to the patient about the discharge plan, medications, and follow-up visits. Pt understands and agrees with the treatment plan.  Discharged Condition: stable  Diet as tolerated  Activities as tolerated  Discharge to: Home or Self Care    TIME SPENT ON DISCHARGE   35 minutes        Meghann Stiles MD  Internal Medicine  Department of Hospital Medicine [facility]      This document was created using electronic dictation services.  Please excuse any errors that may have been made.  Contact me if any questions regarding documentation to clarify verbiage.

## 2025-07-03 NOTE — PT/OT/SLP EVAL
Physical Therapy Evaluation and Discharge Note    Patient Name:  Nabeel Avelar   MRN:  [id number]    Recommendations:     Discharge Recommendations: No Therapy Indicated  Discharge Equipment Recommendations: none   Barriers to discharge: None    Assessment:     Nabeel Avelar is a 65 y.o. male admitted with a medical diagnosis of Slurred speech. At this time, patient is functioning at their prior level of function and does not require further acute PT services. EVALUATION ONLY.    Recent Surgery: * No surgery found *      Plan:     During this hospitalization, patient does not require further acute PT services.  Please re-consult if situation changes.      Subjective     Chief Complaint: no complaints at this time  Patient/Family Comments/goals: return home  Pain/Comfort:  Pain Rating 1: 0/10    Patients cultural, spiritual, Moravian conflicts given the current situation: no    Living Environment:  Pt reports living alone in a single story home with some steps to enter; LPN reports family lives next to patient.   Prior to admission, patients level of function was independent with ADLs and functional mobility, without use of AD. Some assistance required from family with groceries and meals. Equipment used at home: none.  DME owned (not currently used): none.  Upon discharge, patient will have assistance from family.    Objective:     Communicated with LPN prior to session.  Patient found HOB elevated with bed alarm, telemetry, peripheral IV upon PT entry to room.    General Precautions: Standard, fall    Orthopedic Precautions:N/A   Braces: N/A  Respiratory Status: Room air    Exams:  Cognitive Exam:  Patient is oriented to Person, Place, Time, and Situation  RLE ROM: WNL  RLE Strength: WNL  LLE ROM: WNL  LLE Strength: WNL    Functional Mobility:  Bed Mobility:     Supine to Sit: independence  Sit to Supine: independence  Transfers:     Sit to Stand:  independence with no AD  Bed to Chair: supervision with  no AD   using  Stand Pivot  Gait: Ambulated 350 ft with no AD, CGA; observed fast pace (that pt reports is normal for him), even distribution of weight between B LE, good step/stride lengths, occasional unsteadiness but no LOB; all lines remained intact and chair followed behind    AM-PAC 6 CLICK MOBILITY  Total Score:24       Treatment and Education:  Patient educated on role of acute care PT and PT POC, safety while in hospital including calling nurse for mobility, and call light usage  Patient educated about importance of OOB mobility with staff member assistance and remaining up in chair most of the day.    Patient clear to ambulate to/from bathroom with RN/PCT, assist x1 person with gait belt CGA; supervise due to impulsivity.     AM-PAC 6 CLICK MOBILITY  Total Score:24     Patient left HOB elevated with all lines intact, call button in reach, bed alarm on, and LPN notified.    GOALS:   Multidisciplinary Problems       Physical Therapy Goals       Not on file                    DME Justifications:  No DME recommended requiring DME justifications    History:     No past medical history on file.    No past surgical history on file.    Time Tracking:     PT Received On: 07/03/25  PT Start Time: 1005     PT Stop Time: 1023  PT Total Time (min): 18 min     Billable Minutes: Evaluation 18 min      07/03/2025

## 2025-07-03 NOTE — PLAN OF CARE
Problem: Adult Inpatient Plan of Care  Goal: Plan of Care Review  Outcome: Met  Goal: Patient-Specific Goal (Individualized)  Outcome: Met  Goal: Absence of Hospital-Acquired Illness or Injury  Outcome: Met  Goal: Optimal Comfort and Wellbeing  Outcome: Met  Goal: Readiness for Transition of Care  Outcome: Met     Problem: Stroke, Ischemic (Includes Transient Ischemic Attack)  Goal: Optimal Coping  Outcome: Met  Goal: Effective Bowel Elimination  Outcome: Met  Goal: Optimal Cerebral Tissue Perfusion  Outcome: Met  Goal: Optimal Cognitive Function  Outcome: Met  Intervention: Optimize Cognitive Function  Flowsheets (Taken 7/3/2025 1300)  Environment Familiarity/Consistency: daily routine followed  Sensory Stimulation Regulation: auditory stimulation minimized  Goal: Improved Communication Skills  Outcome: Met  Intervention: Optimize Communication Skills  Flowsheets (Taken 7/3/2025 1300)  Communication Enhancement Strategies:   call light answered in person   extra time allowed for response  Goal: Optimal Functional Ability  Outcome: Met  Intervention: Optimize Functional Ability  Flowsheets (Taken 7/3/2025 1300)  Self-Care Promotion:   independence encouraged   adaptive equipment use encouraged   BADL personal objects within reach  Activity Management: Rolling - L1  Goal: Optimal Nutrition Intake  Outcome: Met  Intervention: Promote and Optimize Fluid and Food Intake  Flowsheets (Taken 7/3/2025 1300)  Oral Nutrition Promotion: adaptive equipment use encouraged  Nutrition Interventions: food preferences provided  Goal: Effective Oxygenation and Ventilation  Outcome: Met  Goal: Improved Sensorimotor Function  Outcome: Met  Intervention: Optimize Range of Motion, Motor Control and Function  Flowsheets (Taken 7/3/2025 1300)  Range of Motion: active ROM (range of motion) encouraged  Goal: Safe and Effective Swallow  Outcome: Met  Intervention: Promote and Optimize Fluid and Food Intake  Flowsheets (Taken 7/3/2025  0710: Bedside and Verbal shift change report given to Darron Dillard RN (oncoming nurse) by Nadya Garg RN (offgoing nurse). Report included the following information SBAR, Procedure Summary, Intake/Output, MAR, Recent Results and Med Rec Status. 0745: Patient is willing to have peritoneal dialysis this morning, and is agreeing to have lab draws. Is still refusing to have another IV placed as he does not want IV calcium replacement. Is agreeable to continue with oral replacement. 0800: Peritoneal Dialysis started on patients home machine, patient assisting with set-up. 1130: Patient is still refusing another IV. Is taking his oral calcium per order. 1645: PD completed. Patient had unhooked himself from machine when nursing went into his room. No information was available on patients home machine regarding dialysis treatment. 1920: Bedside and Verbal shift change report given to BOOGIE Bauman (oncoming nurse) by Darron Dlilard RN and AUGUSTO KELLEY RN (offgoing nurse). Report included the following information SBAR, Procedure Summary, Intake/Output, MAR, Recent Results and Med Rec Status. 1300)  Aspiration Precautions:   awake/alert before oral intake   oral hygiene care promoted  Feeding/Eating Techniques: monitored for feeding stress cues  Swallowing Method: throat clear/extra swallow  Goal: Effective Urinary Elimination  Outcome: Met  Intervention: Promote Effective Bladder Elimination  Flowsheets (Taken 7/3/2025 1300)  Urinary Elimination Promotion: frequent voiding encouraged     Problem: Fall Injury Risk  Goal: Absence of Fall and Fall-Related Injury  Outcome: Met  Intervention: Identify and Manage Contributors  Flowsheets (Taken 7/3/2025 1300)  Self-Care Promotion:   independence encouraged   adaptive equipment use encouraged   BADL personal objects within reach  Medication Review/Management: medications reviewed  Intervention: Promote Injury-Free Environment  Flowsheets (Taken 7/3/2025 1300)  Safety Promotion/Fall Prevention:   assistive device/personal item within reach   Fall Risk reviewed with patient/family   instructed to call staff for mobility   nonskid shoes/socks when out of bed   side rails raised x 2   lighting adjusted   medications reviewed   gait belt with ambulation

## 2025-07-03 NOTE — NURSING
Patient and patients brother in law, Rich Treviño, were given discharge orders. Both verbalized understanding. All questions were answered.Patient IV was discontinued. Patient left with all belongings via [employer].

## 2025-07-03 NOTE — H&P
[facility]  [facility] MEDICINE - H&P ADMISSION NOTE      Patient Name: Nabeel Avelar  MRN: [id number]  Patient Class: OP- Observation   Admission Date: 7/2/2025   Admitting Physician: LEONID Service   Attending Physician: Meghann Stiles MD  Primary Care Provider: Gissel Primary Doctor  Face-to-Face encounter date: 07/03/2025        CHIEF COMPLAINT     Chief Complaint   Patient presents with    Slurred Speech      Pt brought in by EMS from home, per EMS pt family called d/t pt having slurred speech that started around 1930; no extremity deficit noted; face symmetrical.  with EMS.        HISTORY OF PRESENTING ILLNESS   Code Status: Full  Social drivers: I have screed the patient for housing insecurity, food insecurity, access to follow up appointments as well as issues with safety at home and have not identified them at this time but will address as needs arise.     65-year-old male with a history of hypertension and hyperlipidemia presented via EMS for evaluation of acute onset slurred speech noted by family around 1930. No facial droop, extremity weakness, or other focal deficits were reported. Per EMS, CBG was 127 on arrival. Patient was aware of the speech difficulty but states it has mostly resolved by the time of evaluation. Baseline neurological status is unclear; however, family reports symptoms were transient. He is illiterate, communicates in a mixture of English and Portuguese, and admits to alcohol use though denies intake on the day of presentation. He denies seizures, chest pain, shortness of breath, or headache.  On arrival to the ED, exam was notable for garbled speech and strabismus, but otherwise non-focal with intact strength and orientation. CT head and CTA head/neck showed no acute infarct, hemorrhage, or large vessel occlusion. MRI brain pending at time of note. Labs were significant for hypokalemia (K+ 3.3, repleted), Hgb 19.7, and borderline thrombocytopenia. EKG  unremarkable. NIHSS was 3.  Patient is now at neurological baseline with resolution of acute deficits. Speech therapy evaluation pending. If MRI and speech eval are unremarkable, patient will be appropriate for discharge.        PAST MEDICAL HISTORY   No past medical history on file.    PAST SURGICAL HISTORY   No past surgical history on file.    FAMILY HISTORY   Reviewed and noncontributory to this case    SOCIAL HISTORY   Social History[1]    HOME MEDICATIONS     Prior to Admission medications    Medication Sig Start Date End Date Taking? Authorizing Provider   pravastatin (PRAVACHOL) 80 MG tablet Take 80 mg by mouth once daily.   Yes Provider, Historical   albuterol (PROVENTIL/VENTOLIN HFA) 90 mcg/actuation inhaler 2 puffs every 4 (four) hours as needed. 2/14/25   Provider, Historical   atorvastatin (LIPITOR) 80 MG tablet Take 80 mg by mouth once daily. 6/10/25   Provider, Historical   buPROPion (WELLBUTRIN XL) 150 MG TB24 tablet Take 150 mg by mouth once daily. 5/14/25   Provider, Historical   citalopram (CELEXA) 20 MG tablet Take 20 mg by mouth once daily.    Provider, Historical   divalproex (DEPAKOTE) 500 MG TbEC Take 1,000 mg by mouth every evening.    Provider, Historical   QUEtiapine (SEROQUEL) 100 MG Tab Take 100 mg by mouth every evening. 5/14/25   Provider, Historical   sertraline (ZOLOFT) 100 MG tablet Take 200 mg by mouth once daily. 5/14/25   Provider, Historical       ALLERGIES   Patient has no known allergies.          REVIEW OF SYSTEMS   Except as documented above, all other systems reviewed and negative    PHYSICAL EXAM     Vitals:    07/03/25 0811   BP:    Pulse: 78   Resp: 18   Temp:       General:  In no acute distress, resting comfortably  Head and neck:  Atraumatic, normocephalic, moist mucous membranes, supple neck  Chest:  Clear to auscultation bilaterally  Heart:  S1, S2, no appreciable murmur  Abdomen:  Soft, nontender, BS +  MSK:  Warm, no lower extremity edema, no clubbing or  cyanosis  Neuro:  Alert and oriented x4, moving all extremities with good strength  Integumentary:  No obvious skin rash  Psychiatry:  Appropriate mood and affect          ASSESSMENT AND PLAN     65-year-old male with HTN, HLD presenting with acute focal neurologic deficit (slurred speech):  Acute focal neurologic deficit (TIA vs CVA vs other):  Transient episode of garbled speech noted by family, no focal deficits on exam, symptoms resolved.  CT head negative for acute process; CTA head/neck shows no large vessel occlusion or hemodynamically significant stenosis.  MRI brain pending to evaluate for small infarct.  NIHSS 3 on arrival.  Neurologically at baseline currently.  Plan: Continue monitoring while awaiting MRI and formal speech therapy evaluation. If both are reassuring, plan for discharge with outpatient neurology follow-up.  Hypokalemia:  K+ 3.3, replaced with oral potassium.  Will recheck levels if clinical concern arises.  Elevated Hgb/Hct (Hgb 19.7, Hct 60.1):  Likely hemoconcentration; monitor hydration status and reassess if clinically indicated.  Borderline thrombocytopenia (Plt 104):  Monitor trend; no bleeding or bruising. May be chronic or multifactorial (alcohol, dilutional, artifact).  Alcohol use disorder:  Admits to ongoing use, unclear quantity or frequency. Denies recent intake.  Ethanol level <10.  Administered thiamine IV.  Consider CIWA protocol if withdrawal suspected.  Hypertension:  Elevated BP on arrival (171/99); no acute end-organ damage.  Resume or initiate oral antihypertensives as needed for long-term control.  Disposition:  Await MRI and ST evaluation.  If no new findings and patient remains at baseline, medically stable for discharge home with neurology follow-up.    DVT PPX Lovenox  __________________________________________________________________  LABS/MICRO/MEDS/DIAGNOSTICS       LABS  Recent Labs     07/02/25  2107   *   K 3.3*   CO2 33*   BUN 15.5   CREATININE 0.97    CALCIUM 9.3   ALKPHOS 77   AST 22   ALT 10   ALBUMIN 3.3*     Recent Labs     07/02/25  2107   WBC 4.93   RBC 6.92*   HCT 60.1*   MCV 86.8   *       MICROBIOLOGY  Microbiology Results (last 7 days)       ** No results found for the last 168 hours. **            MEDICATIONS   atorvastatin  80 mg Oral Daily    buPROPion  150 mg Oral Daily    citalopram  20 mg Oral Daily    divalproex  1,000 mg Oral QHS    enoxparin  40 mg Subcutaneous Q24H (prophylaxis, 1700)    pantoprazole  40 mg Oral Daily      INFUSIONS   0.9% NaCl   Intravenous Continuous           DIAGNOSTIC TESTS  CTA Head and Neck (xpd)   Final Result      No evidence of hemodynamically significant stenosis or large vessel occlusion.         Electronically signed by: Carroll Cabrera   Date:    07/03/2025   Time:    07:56      CT HEAD FOR CODE STROKE   Final Result      No acute intracranial pathology.      I agree with the preliminary report.      The preliminary radiologist discussed the findings with Dr. No at 20:45 hours on 07/02/2025.         Electronically signed by: Carroll Cabrera   Date:    07/03/2025   Time:    07:59      MRI Brain Without Contrast    (Results Pending)          Patient information was obtained from patient, patient's family, past medical records and ER records.   All diagnosis and differential diagnosis have been reviewed; assessment and plan has been documented. I have personally reviewed the labs and test results that are presently available; I have reviewed the patients medication list. I have reviewed the consulting providers response and recommendations. I have reviewed or attempted to review medical records based upon their availability.  All of the patient's questions have been addressed and answered. Patient's is agreeable to the above stated plan. I will continue to monitor closely and make adjustments to medical management as needed.  This note was created using Vudu voice recognition software that occasionally  misinterpreted phrases or words.  Please contact me if any questions may rise regarding documentation to clarify verbiage.        Meghann Stiles MD   Internal Medicine  Department of Hospital Medicine [facility]         [1]   Social History  Socioeconomic History    Marital status: Single

## 2025-07-03 NOTE — ED NOTES
Pt presents w [facility]asi for slurred speech per family who call. Starting around 1930- no other deficits cited or noted.  Pt reports he is fatigued and wants to sleep.   Perrla- 3 mm /brisk- has rt eye visual issues  that are not new per aasi who confirmed this w pt and  family. Pt denies etoh or drug use.  Pt unable to read he states - so  words/phrases on  nihss not done per pt- was able to cite objects and  pictures.  Speech is very garbled and at times is more clear than others.  Also he  speaks in Greenlandic  some words mixed w english.

## 2025-07-03 NOTE — PT/OT/SLP EVAL
Occupational Therapy   Evaluation and Discharge Note    Name: Nabeel Avelar  MRN: [id number]  Admitting Diagnosis: Slurred speech  Recent Surgery: * No surgery found *      Recommendations:     Discharge Recommendations: No Therapy Indicated  Discharge Equipment Recommendations: none  Barriers to discharge:  None    Assessment:     Nabeel Avelar is a 65 y.o. male with a medical diagnosis of Slurred speech. At this time, patient is functioning at their prior level of function and does not require further acute OT services. This will serve as EVALUATION ONLY.    Plan:     During this hospitalization, patient does not require further acute OT services.  Please re-consult if situation changes.    Plan of Care Reviewed with: patient    Subjective     Chief Complaint: no complaints at this time  Patient/Family Comments/goals: return home    Occupational Profile:  Living Environment: pt reports living alone in SS home with steps to enter; RN reports family lives next to patient.    Previous level of function: independent in ADLs; ambulating without device. Pt reports family assists with groceries and meals.  Roles and Routines: enjoys singing and dancing   Equipment Used at home: none  Assistance upon Discharge: family     Pain/Comfort:  Pain Rating 1: 0/10    Patients cultural, spiritual, [employer]ybeterian conflicts given the current situation: no    Objective:     Communicated with: RN prior to session.  Patient found supine with bed alarm, peripheral IV upon OT entry to room.    General Precautions: Standard, fall  Orthopedic Precautions: N/A  Braces: N/A  Respiratory Status: Room air     Occupational Performance:    Bed Mobility:    Patient completed Supine to Sit with independence  Patient completed Sit to Supine with independence    Functional Mobility/Transfers:  Patient completed Sit <> Stand Transfer with independence  with  no assistive device   Patient completed Bed <> Chair Transfer using Stand Pivot technique with  supervision with no assistive device  Functional Mobility: x350 feet with no device CGA    Activities of Daily Living:  Upper Body Dressing: independence    Lower Body Dressing: independence      Cognitive/Visual Perceptual:  Cognitive/Psychosocial Skills:     -       Oriented to: Person, Place, Time, and Situation     Physical Exam:  Upper Extremity Range of Motion:     -       Right Upper Extremity: WNL  -       Left Upper Extremity: WNL  Upper Extremity Strength:    -       Right Upper Extremity: WNL  -       Left Upper Extremity: WNL    AMPAC 6 Click ADL:  AMPAC Total Score: 24    Treatment & Education:  Patient educated on importance of OOB activities with staff member assistance and sitting OOB majority of the day.     Patient clear to ambulate to/from bathroom with RN/PCT, assist x1 person with gait belt CGA; supervise due to impulsivity.    Patient left supine with all lines intact, call button in reach, and bed alarm on.    DME Justifications:  No DME recommended requiring DME justifications    History:     No past medical history on file.    No past surgical history on file.    Time Tracking:     OT Date of Treatment: 07/03/25  OT Start Time: 1005  OT Stop Time: 1023  OT Total Time (min): 18 min    Billable Minutes:Evaluation 18 min    7/3/2025

## 2025-07-03 NOTE — ED PROVIDER NOTES
Encounter Date: 7/2/2025       History     Chief Complaint   Patient presents with    Slurred Speech      Pt brought in by EMS from home, per EMS pt family called d/t pt having slurred speech that started around 1930; no extremity deficit noted; face symmetrical.  with EMS.      66 y/o M w/pmhx HTN, HLD arrrives w/EMS approx 2hrs after family noted difficulty with speech. Patient was aware of the problem, but states he largely feels it has resolved. Baseline unclear. He states he is illiterate, speaks a mix of English/[place]e. He endorses some alcohol use, but cannot be more specific. He denies use today, denies seizures in the past.            Review of patient's allergies indicates:  No Known Allergies  No past medical history on file.  No past surgical history on file.  No family history on file.  Social History[1]  Review of Systems   Respiratory:  Negative for shortness of breath.    Cardiovascular:  Negative for chest pain.   Neurological:  Negative for headaches.        Dysphasia    All other systems reviewed and are negative.      Physical Exam     Initial Vitals [07/02/25 2031]   BP Pulse Resp Temp SpO2   (!) 171/99 81 16 98.2 °F (36.8 °C) 99 %      MAP       --         Physical Exam    Constitutional:   Disheveled    HENT:   Head: Normocephalic and atraumatic.   Eyes: Pupils are equal, round, and reactive to light.   strabismus   Neck: Neck supple.   Normal range of motion.  Cardiovascular:  Normal rate, regular rhythm, normal heart sounds and intact distal pulses.           No murmur heard.  Pulmonary/Chest: Breath sounds normal. No respiratory distress. He has no wheezes. He has no rhonchi. He has no rales. He exhibits no tenderness.   Abdominal: Abdomen is soft. There is no abdominal tenderness.   Musculoskeletal:         General: Normal range of motion.      Cervical back: Normal range of motion and neck supple.     Neurological: He is alert and oriented to person, place, and time. He has normal  strength. No cranial nerve deficit or sensory deficit. GCS score is 15. GCS eye subscore is 4. GCS verbal subscore is 5. GCS motor subscore is 6.   Garbled speech          ED Course   Procedures  Labs Reviewed   COMPREHENSIVE METABOLIC PANEL - Abnormal       Result Value    Sodium 146 (*)     Potassium 3.3 (*)     Chloride 111 (*)     CO2 33 (*)     Glucose 114      Blood Urea Nitrogen 15.5      Creatinine 0.97      Calcium 9.3      Protein Total 7.0      Albumin 3.3 (*)     Globulin 3.7 (*)     Albumin/Globulin Ratio 0.9 (*)     Bilirubin Total 0.4      ALP 77      ALT 10      AST 22      eGFR >60      Anion Gap 2.0      BUN/Creatinine Ratio 16     PROTIME-INR - Abnormal    PT 11.0 (*)     INR 1.1      Narrative:     Protimes are used to monitor anticoagulant agents such as warfarin. PT INR values are based on the current patient normal mean and the JESSIE value for the specific instrument reagent used.  **Routine theraputic target values for the INR are 2.0-3.0**   CBC WITH DIFFERENTIAL - Abnormal    WBC 4.93      RBC 6.92 (*)     Hgb 19.7 (*)     Hct 60.1 (*)     MCV 86.8      MCH 28.5      MCHC 32.8 (*)     RDW 16.5      Platelet 104 (*)     MPV 9.6      Neut % 52.5      Lymph % 36.3      Mono % 5.1      Eos % 5.9      Basophil % 0.2      Imm Grans % 0.0      Neut # 2.59      Lymph # 1.79      Mono # 0.25      Eos # 0.29      Baso # 0.01      Imm Gran # 0.00     ISTAT PROCEDURE - Abnormal    POC PH 7.371      POC PCO2 46.4 (*)     POC PO2 57      POC HCO3 26.9      POC BE 2      POC SATURATED O2 88      POC TCO2 28      Sample VENOUS     TSH - Normal    TSH 2.664     ALCOHOL,MEDICAL (ETHANOL) - Normal    Ethanol Level <10.0     CBC W/ AUTO DIFFERENTIAL    Narrative:     The following orders were created for panel order CBC W/ AUTO DIFFERENTIAL.  Procedure                               Abnormality         Status                     ---------                               -----------         ------                      CBC with Differential[[id number]]       Abnormal            Final result                 Please view results for these tests on the individual orders.   LIPID PANEL    Cholesterol Total 164      HDL Cholesterol 35      Triglyceride 102      Cholesterol/HDL Ratio 5      Very Low Density Lipoprotein 20      LDL Cholesterol 109.00     POCT GLUCOSE, HAND-HELD DEVICE          Imaging Results              CT HEAD FOR CODE STROKE (Preliminary result)  Result time 07/02/25 20:47:11      Preliminary result by Luis Enrique Lord Jr., MD (07/02/25 20:47:11)                   Narrative:    START OF REPORT:  Technique: CT of the head was performed without intravenous contrast with axial as well as coronal and sagittal images.    Comparison: Comparison is with study dated 2020-09-03 10:06:23.    Dosage Information: Automated Exposure Control was utilized 1105.1 mGy.cm. Number of irradiation events 3.    Clinical history: Slurred speech. Stroke eval.    Findings:  Hemorrhage: No acute intracranial hemorrhage is seen.  CSF spaces: The ventricles, sulci and basal cisterns all appear mildly prominent suggesting an element of global cerebral atrophy.  Brain parenchyma: No acute infarct is identified. Mild microvascular change is seen in portions of the periventricular and deep white matter tracts.  Cerebellum: Unremarkable.  Vascular: Mild atheromatous calcification of the intracranial arteries is seen.  Sella and skull base: The sella appears to be within normal limits for age.  Intracranial calcifications: Incidental note is made of bilateral choroid plexus calcification. Incidental note is made of some pineal region calcification.  Calvarium: No acute linear or depressed skull fracture is seen.    Maxillofacial Structures:  Paranasal sinuses: The visualized paranasal sinuses appear clear with no significant mucoperiosteal thickening or air fluid levels identified.  Orbits: The orbits appear unremarkable.  Zygomatic arches: The  zygomatic arches are intact and unremarkable.  Temporal bones and mastoids: The temporal bones and mastoids appear unremarkable.  TMJ: The mandibular condyles appear normally placed with respect to the mandibular fossa.    Notifications: This is a stroke protocol report and the results were discussed with the emergency room physician Dr No prior to dictation at 2025-07-02 20:45:52 CDT.      Impression:  1. No acute intracranial process identified. Details and other findings as noted above.                                         Medications   enoxaparin injection 40 mg (has no administration in time range)   potassium chloride SA CR tablet 40 mEq (40 mEq Oral Given 7/2/25 2237)   thiamine injection 400 mg (400 mg Intravenous Given 7/2/25 2238)     Medical Decision Making  66 y/o M w/pmhx HTN, HLD arrrives w/EMS approx 2hrs after family noted difficulty with speech. Patient was aware of the problem, but states he largely feels it has resolved. Baseline unclear.  HTN other vitals wnl  Exam significant for garbled speech, strabismus, no focal weakness or sensory loss  DDX includes, but not limited to CVA, TIA, intox.  CT head neg. EKG unremarkable. NIHSS 3. Possible small infarct. Not given tpa as baseline unclear, symptoms not progressing.  Labs notable for hypoK+ repleted. Hgb 19.  Admit for TIA/stroke r/o.    Amount and/or Complexity of Data Reviewed  Labs: ordered.  Radiology: ordered.    Risk  Prescription drug management.                                      Clinical Impression:  Final diagnoses:  [R29.818] Acute focal neurological deficit          ED Disposition Condition    Observation                       [1]         Hasmukh Camarena MD  07/03/25 0147

## 2025-07-07 ENCOUNTER — PATIENT OUTREACH (OUTPATIENT)
Dept: ADMINISTRATIVE | Facility: CLINIC | Age: 65
End: 2025-07-07
Payer: MEDICARE

## 2025-07-07 NOTE — PROGRESS NOTES
C3 nurse attempted to contact Nabeel Avelar for a TCC post hospital discharge follow up call. No answer, No VM reached . The patient has a scheduled HOSFU appointment with Bernardo Odonnell MD July 24 at 9am.

## 2025-07-08 ENCOUNTER — PATIENT MESSAGE (OUTPATIENT)
Dept: ADMINISTRATIVE | Facility: CLINIC | Age: 65
End: 2025-07-08
Payer: MEDICARE

## 2025-08-21 ENCOUNTER — LAB VISIT (OUTPATIENT)
Dept: LAB | Facility: HOSPITAL | Age: 65
End: 2025-08-21
Attending: FAMILY MEDICINE
Payer: MEDICARE